# Patient Record
Sex: MALE | Race: WHITE | URBAN - METROPOLITAN AREA
[De-identification: names, ages, dates, MRNs, and addresses within clinical notes are randomized per-mention and may not be internally consistent; named-entity substitution may affect disease eponyms.]

---

## 2018-03-12 ENCOUNTER — OFFICE VISIT (OUTPATIENT)
Dept: FAMILY MEDICINE CLINIC | Age: 33
End: 2018-03-12

## 2018-03-12 VITALS
WEIGHT: 166 LBS | OXYGEN SATURATION: 98 % | RESPIRATION RATE: 16 BRPM | BODY MASS INDEX: 24.59 KG/M2 | DIASTOLIC BLOOD PRESSURE: 69 MMHG | SYSTOLIC BLOOD PRESSURE: 116 MMHG | HEART RATE: 55 BPM | TEMPERATURE: 97 F | HEIGHT: 69 IN

## 2018-03-12 DIAGNOSIS — H10.9 CONJUNCTIVITIS OF RIGHT EYE, UNSPECIFIED CONJUNCTIVITIS TYPE: Primary | ICD-10-CM

## 2018-03-12 RX ORDER — TOBRAMYCIN 3 MG/ML
1 SOLUTION/ DROPS OPHTHALMIC EVERY 6 HOURS
Qty: 1 BOTTLE | Refills: 1 | Status: SHIPPED | OUTPATIENT
Start: 2018-03-12 | End: 2018-03-22

## 2018-03-12 NOTE — PROGRESS NOTES
Layne Glynn is a 27 y.o. male presents in office to be seen for red eye x 3 days. Health Maintenance Due   Topic Date Due    DTaP/Tdap/Td series (1 - Tdap) 04/05/2004    Influenza Age 5 to Adult  08/01/2017       1. Have you been to the ER, urgent care clinic since your last visit? Hospitalized since your last visit?no    2. Have you seen or consulted any other health care providers outside of the [address] [place] [address] since your last visit? Include any pap smears or colon screening.  no

## 2018-03-12 NOTE — PROGRESS NOTES
Brett Hudson     Chief Complaint   Patient presents with    Red Eye     Vitals:    03/12/18 1511   BP: 116/69   Pulse: (!) 55   Resp: 16   Temp: 97 °F (36.1 °C)   TempSrc: Oral   SpO2: 98%   Weight: 166 lb (75.3 kg)   Height: 5' 9.02\" (1.753 m)   PainSc:   6   PainLoc: Eye         HPI: Patient is here complaining of right eye redness and tearing and some whitish discharge for the last 3 days, except when he had discharge that I feel blurred but there is no actual blurred vision he does not see double vision. He does not have any cold symptoms no nausea no vomiting no cough no runny nose. He does not have any chest pain no palpitation. He is not on any medication  History reviewed. No pertinent past medical history. History reviewed. No pertinent surgical history. Social History   Substance Use Topics    Smoking status: Former Smoker     Packs/day: 0.30     Years: 5.00     Types: Cigarettes    Smokeless tobacco: Never Used    Alcohol use 0.0 oz/week     0 Standard drinks or equivalent per week      Comment: socially       History reviewed. No pertinent family history. Review of Systems   Constitutional: Negative for chills, fever, malaise/fatigue and weight loss. HENT: Negative for congestion, ear discharge, ear pain, hearing loss and nosebleeds. Eyes: Positive for discharge and redness. Negative for blurred vision and double vision. Respiratory: Negative for cough. Cardiovascular: Negative for chest pain, palpitations, claudication and leg swelling. Gastrointestinal: Negative for abdominal pain, constipation, diarrhea, nausea and vomiting. Genitourinary: Negative for dysuria, frequency and urgency. Musculoskeletal: Negative for myalgias. Skin: Negative for itching and rash. Neurological: Negative for dizziness, tingling, sensory change, speech change, focal weakness, weakness and headaches. Physical Exam   Constitutional: He is oriented to person, place, and time.  He appears well-developed and well-nourished. No distress. HENT:   Head: Normocephalic and atraumatic. Mouth/Throat: No oropharyngeal exudate. Eyes: Conjunctivae are normal. Pupils are equal, round, and reactive to light. Right eye exhibits no discharge. Left eye exhibits no discharge. No scleral icterus. Right eye there is conjunctival erythema and there is no discharge at the time of the exam   Neck: Normal range of motion. Neck supple. No thyromegaly present. Cardiovascular: Normal rate, regular rhythm and normal heart sounds. Pulmonary/Chest: Effort normal and breath sounds normal. No respiratory distress. He has no rales. Abdominal: Soft. Musculoskeletal: Normal range of motion. He exhibits no edema, tenderness or deformity. Lymphadenopathy:     He has no cervical adenopathy. Neurological: He is alert and oriented to person, place, and time. No cranial nerve deficit. Coordination normal.   Skin: Skin is warm and dry. No rash noted. He is not diaphoretic. No erythema. Psychiatric: He has a normal mood and affect. Judgment and thought content normal.        Assessment and plan     1.  Conjunctivitis of right eye, unspecified conjunctivitis type  Tobramycin apply 3 times a day for 7 days    Current Outpatient Prescriptions   Medication Sig Dispense Refill    tobramycin (TOBREX) 0.3 % ophthalmic ointment Apply to the right eye  3 times daily for 7 days 1 Tube 1       Patient Active Problem List    Diagnosis Date Noted    Malaise and fatigue 04/11/2013    Body aches 04/11/2013    Sore throat 04/11/2013    Cough 04/11/2013     Results for orders placed or performed in visit on 09/14/12   AMB POC URINALYSIS DIP STICK AUTO W/O MICRO   Result Value Ref Range    Color (UA POC)  (none)    Clarity (UA POC)  (none)    Glucose (UA POC)  (none)    Bilirubin (UA POC)  (none)    Ketones (UA POC)  (none)    Specific gravity (UA POC)  1.001 - 1.035    Blood (UA POC)  (none)    pH (UA POC)  4.6 - 8.0 Protein (UA POC)  Negative mg/dL    Urobilinogen (UA POC)      Nitrites (UA POC)  (none)    Leukocyte esterase (UA POC)  (none)     No visits with results within 3 Month(s) from this visit. Latest known visit with results is:    Office Visit on 05/11/2011   Component Date Value Ref Range Status    T4, Free 05/11/2011 1.27  0.82 - 1.77 ng/dL Final    T3, total 05/11/2011 101  71 - 180 ng/dL Final    Comment: Performed At: [address]  [phone]                           David Ordonez MD                           [phone]          Follow-up Disposition:  Return if symptoms worsen or fail to improve.

## 2018-03-12 NOTE — MR AVS SNAPSHOT
[address] [facility] 
 
 
 [address] [address] 
[phone] Patient: Jenna Gee MRN: [id number] Visit Information Date & Time Provider Department Dept. Phone Encounter #  
 3/12/2018  3:00 PM 57 Wendy Figueredo MD [facility] [place] [phone] [id number] Follow-up Instructions Return if symptoms worsen or fail to improve. Upcoming Health Maintenance Date Due DTaP/Tdap/Td series (1 - Tdap) 4/5/2004 Influenza Age 5 to Adult 8/1/2017 Allergies as of 3/12/2018  Review Complete On: 3/12/2018 By: Myrna Breaux LPN No Known Allergies Current Immunizations  Never Reviewed No immunizations on file. Not reviewed this visit You Were Diagnosed With   
  
 Codes Comments Conjunctivitis of right eye, unspecified conjunctivitis type    -  Primary ICD-10-CM: H10.9 ICD-9-CM: 372.30 Vitals BP Pulse Temp Resp Height(growth percentile) Weight(growth percentile) 116/69 (!) 55 97 °F (36.1 °C) (Oral) 16 5' 9.02\" (1.753 m) 166 lb (75.3 kg) SpO2 BMI Smoking Status 98% 24.5 kg/m2 Former Smoker Vitals History BMI and BSA Data Body Mass Index Body Surface Area 24.5 kg/m 2 1.91 m 2 Preferred Pharmacy Pharmacy Name Phone [address] [place] [address] Ave AT [address] [place] [address] & [facility] [phone] Your Updated Medication List  
  
   
This list is accurate as of 3/12/18  3:34 PM.  Always use your most recent med list.  
  
  
  
  
 tobramycin 0.3 % ophthalmic ointment Commonly known as:  Megan Rosa Apply to the right eye  3 times daily for 7 days Prescriptions Sent to Pharmacy Refills  
 tobramycin (TOBREX) 0.3 % ophthalmic ointment 1 Sig: Apply to the right eye  3 times daily for 7 days  Class: Normal  
 Pharmacy: [facility] [address], [address]  #: [phone] Follow-up Instructions Return if symptoms worsen or fail to improve. Introducing [facility] SERVICES! Dear Hayde Ordonez: Thank you for requesting a [employer] account. Our records indicate that you already have an active [employer] account. You can access your account anytime at [email or web address] Did you know that you can access your hospital and ER discharge instructions at any time in [employer]? You can also review all of your test results from your hospital stay or ER visit. Additional Information If you have questions, please visit the Frequently Asked Questions section of the [employer] website at [email or web address] Remember, [employer] is NOT to be used for urgent needs. For medical emergencies, dial 911. Now available from your iPhone and Android! Please provide this summary of care documentation to your next provider. Your primary care clinician is listed as Paul Bains. If you have any questions after today's visit, please call [phone].

## 2018-03-14 ENCOUNTER — TELEPHONE (OUTPATIENT)
Dept: FAMILY MEDICINE CLINIC | Age: 33
End: 2018-03-14

## 2018-03-14 NOTE — TELEPHONE ENCOUNTER
Patient states he thinks he has the flu and wants a prescription for tamiflu. Patient is going out of town tomorrow. An appointment was offered.  Patient would like a call back at phone number [phone]

## 2018-03-15 ENCOUNTER — OFFICE VISIT (OUTPATIENT)
Dept: FAMILY MEDICINE CLINIC | Age: 33
End: 2018-03-15

## 2018-03-15 VITALS
DIASTOLIC BLOOD PRESSURE: 74 MMHG | TEMPERATURE: 99.1 F | BODY MASS INDEX: 23.7 KG/M2 | WEIGHT: 160 LBS | HEART RATE: 85 BPM | OXYGEN SATURATION: 100 % | SYSTOLIC BLOOD PRESSURE: 117 MMHG | HEIGHT: 69 IN

## 2018-03-15 DIAGNOSIS — H04.201 EYE TEARING, RIGHT: ICD-10-CM

## 2018-03-15 DIAGNOSIS — H57.89 REDNESS OF RIGHT EYE: ICD-10-CM

## 2018-03-15 DIAGNOSIS — J02.9 SORE THROAT: Primary | ICD-10-CM

## 2018-03-15 LAB
S PYO AG THROAT QL: NEGATIVE
VALID INTERNAL CONTROL?: YES

## 2018-03-15 RX ORDER — TETRAHYDROZOLINE HCL 0.05 %
1 DROPS OPHTHALMIC (EYE) 4 TIMES DAILY
Qty: 1 BOTTLE | Refills: 0 | Status: SHIPPED | OUTPATIENT
Start: 2018-03-15 | End: 2020-11-30

## 2018-03-15 NOTE — PROGRESS NOTES
Marsha Adler     Chief Complaint   Patient presents with    Eye Swelling     left    Cold Symptoms     Vitals:    03/15/18 0845   BP: 117/74   Pulse: 85   Temp: 99.1 °F (37.3 °C)   TempSrc: Oral   SpO2: 100%   Weight: 160 lb (72.6 kg)   Height: 5' 9\" (1.753 m)         HPI: Ms. Starla Bautista is here to follow-up in his right eye redness he has been started on tobramycin drops, there is no much change he still have the redness tearing and yellowish discharge in the morning, there is no visual changes. He developed chills and subjective fever body aches and sore throat, some painful swallowing. There is no cough no shortness of breath. No chest pain no nausea vomiting no palpitation no abdominal pain. History reviewed. No pertinent past medical history. History reviewed. No pertinent surgical history. Social History   Substance Use Topics    Smoking status: Former Smoker     Packs/day: 0.30     Years: 5.00     Types: Cigarettes    Smokeless tobacco: Never Used    Alcohol use 0.0 oz/week     0 Standard drinks or equivalent per week      Comment: socially       History reviewed. No pertinent family history. Review of Systems   Constitutional: Positive for chills and malaise/fatigue. Negative for fever and weight loss. HENT: Positive for sore throat. Negative for congestion, ear discharge, ear pain, hearing loss, nosebleeds and sinus pain. Eyes: Positive for discharge and redness. Negative for blurred vision, double vision, photophobia and pain. Respiratory: Negative for cough, sputum production, shortness of breath and wheezing. Cardiovascular: Negative for chest pain, palpitations, claudication and leg swelling. Gastrointestinal: Negative for abdominal pain, constipation, diarrhea, nausea and vomiting. Genitourinary: Negative for dysuria, frequency and urgency. Musculoskeletal: Positive for back pain. Negative for joint pain, myalgias and neck pain. Skin: Negative for itching and rash. Neurological: Negative for dizziness, tingling, sensory change, speech change, focal weakness, weakness and headaches. Psychiatric/Behavioral: Negative for depression, hallucinations, substance abuse and suicidal ideas. The patient is not nervous/anxious and does not have insomnia. Physical Exam   Constitutional: He is oriented to person, place, and time. He appears well-developed and well-nourished. No distress. HENT:   Head: Normocephalic and atraumatic. Mouth/Throat: No oropharyngeal exudate. There is some erythema in the throat   Eyes: Pupils are equal, round, and reactive to light. Right eye exhibits no discharge. Left eye exhibits no discharge. No scleral icterus. Right eye there is conjunctival redness, no discharge no blurred vision   Neck: Normal range of motion. Neck supple. No thyromegaly present. Cardiovascular: Normal rate, regular rhythm and normal heart sounds. Pulmonary/Chest: Effort normal and breath sounds normal. No respiratory distress. He has no rales. Abdominal: Soft. Bowel sounds are normal. He exhibits no distension and no mass. There is no tenderness. There is no rebound. Musculoskeletal: Normal range of motion. He exhibits no edema, tenderness or deformity. Lymphadenopathy:     He has no cervical adenopathy. Neurological: He is alert and oriented to person, place, and time. No cranial nerve deficit. Coordination normal.   Skin: Skin is warm and dry. No rash noted. He is not diaphoretic. No erythema. Psychiatric: He has a normal mood and affect. Judgment and thought content normal.        Assessment and plan     1. Sore throat   strep a rapid antigen is negative, most likely viral advised to gargle with salt and water and keep well-hydrated \" AMB POC RAPID STREP A    2. Redness of right eye  Not much improvement with the tobramycin drops with antihistamine for itching tearing to continue the tobramycin.     If patient not improving in 3-4 days he need to come back or if getting worse he should  come back sooner  - tetrahydrozoline (OPTI-CLEAR) 0.05 % ophthalmic solution; Administer 1 Drop to both eyes four (4) times daily. Dispense: 1 Bottle; Refill: 0    3. Eye tearing, right    - tetrahydrozoline (OPTI-CLEAR) 0.05 % ophthalmic solution; Administer 1 Drop to both eyes four (4) times daily. Dispense: 1 Bottle; Refill: 0    Current Outpatient Prescriptions   Medication Sig Dispense Refill    tetrahydrozoline (OPTI-CLEAR) 0.05 % ophthalmic solution Administer 1 Drop to both eyes four (4) times daily. 1 Bottle 0    tobramycin (TOBREX) 0.3 % ophthalmic solution Administer 1 Drop to both eyes every six (6) hours for 10 days. 1 Bottle 1       Patient Active Problem List    Diagnosis Date Noted    Malaise and fatigue 04/11/2013    Body aches 04/11/2013    Sore throat 04/11/2013    Cough 04/11/2013     Results for orders placed or performed in visit on 03/15/18   AMB POC RAPID STREP A   Result Value Ref Range    VALID INTERNAL CONTROL POC Yes     Group A Strep Ag Negative Negative     Office Visit on 03/15/2018   Component Date Value Ref Range Status    VALID INTERNAL CONTROL POC 03/15/2018 Yes   Final    Group A Strep Ag 03/15/2018 Negative  Negative Final          Follow-up Disposition:  Return if symptoms worsen or fail to improve.

## 2018-03-15 NOTE — PROGRESS NOTES
Estevan Marquez is a 27 y.o. male (: 1985) presenting to address:    Chief Complaint   Patient presents with    Eye Swelling     left    Cold Symptoms       Vitals:    03/15/18 0845   BP: 117/74   Pulse: 85   Temp: 99.1 °F (37.3 °C)   TempSrc: Oral   SpO2: 100%   Weight: 160 lb (72.6 kg)   Height: 5' 9\" (1.753 m)       Hearing/Vision:   No exam data present    Learning Assessment:     Learning Assessment 2014   PRIMARY LEARNER Patient   HIGHEST LEVEL OF EDUCATION - PRIMARY LEARNER  4 YEARS OF COLLEGE   BARRIERS PRIMARY LEARNER NONE   PRIMARY LANGUAGE ENGLISH   LEARNER PREFERENCE PRIMARY OTHER (COMMENT)   ANSWERED BY patient   RELATIONSHIP SELF     Depression Screening:     PHQ over the last two weeks 3/12/2018   Little interest or pleasure in doing things Not at all   Feeling down, depressed or hopeless Not at all   Total Score PHQ 2 0     Fall Risk Assessment:     Fall Risk Assessment, last 12 mths 3/12/2018   Able to walk? Yes   Fall in past 12 months? No     Abuse Screening:     Abuse Screening Questionnaire 3/12/2018   Do you ever feel afraid of your partner? N   Are you in a relationship with someone who physically or mentally threatens you? N   Is it safe for you to go home? Y     Coordination of Care Questionaire:   1. Have you been to the ER, urgent care clinic since your last visit? Hospitalized since your last visit? NO    2. Have you seen or consulted any other health care providers outside of the [address] [place] [address] since your last visit? Include any pap smears or colon screening.  NO

## 2018-03-16 NOTE — TELEPHONE ENCOUNTER
LM for patient to [facility] with more details of symptoms

## 2018-03-19 ENCOUNTER — IMPORTED ENCOUNTER (OUTPATIENT)
Dept: URBAN - METROPOLITAN AREA CLINIC 1 | Facility: CLINIC | Age: 33
End: 2018-03-19

## 2018-03-19 PROBLEM — H10.021: Noted: 2018-03-19

## 2018-03-19 PROBLEM — H16.201: Noted: 2018-03-19

## 2018-03-19 PROBLEM — H10.32: Noted: 2018-03-19

## 2018-03-19 PROCEDURE — 92002 INTRM OPH EXAM NEW PATIENT: CPT

## 2018-03-19 NOTE — PATIENT DISCUSSION
1.  Unspecified Acute Conjunctivitis OS2. Bacterial Conjunctivitis OD --The condition was discussed with the patient. Topical antibiotic Maxitrol oint bid OD Cont E-mycin oint ou. Also has filamentary keratitis sup OD. Adeno test neg3. Return for an appointment in 5 days for 10 with Dr. Rob White.

## 2018-03-23 ENCOUNTER — IMPORTED ENCOUNTER (OUTPATIENT)
Dept: URBAN - METROPOLITAN AREA CLINIC 1 | Facility: CLINIC | Age: 33
End: 2018-03-23

## 2018-03-23 PROBLEM — B30.9: Noted: 2018-03-23

## 2018-03-23 PROCEDURE — 92012 INTRM OPH EXAM EST PATIENT: CPT

## 2018-03-23 NOTE — PATIENT DISCUSSION
Viral Conjunctivitis OD>OS- by history pt's wife has also had pink eye and daughter had recent illness. Decrease Maxitrol SHEREE OD only QHS. D/C Emycin SHEREE. The condition was discussed with the patient. Start cool compresses and artificial tears PRN. Sample of Blink tears given to pt. The mechanism of transmission was explained and the patient was educated to wash hands and use separate towels and bedding. Return for an appointment with Dr. Lillian Alejandra ([facility] would like to see pt while he i in office w/ Dr. Any Segura).

## 2018-03-26 ENCOUNTER — IMPORTED ENCOUNTER (OUTPATIENT)
Dept: URBAN - METROPOLITAN AREA CLINIC 1 | Facility: CLINIC | Age: 33
End: 2018-03-26

## 2018-03-26 PROCEDURE — 92012 INTRM OPH EXAM EST PATIENT: CPT

## 2018-03-26 NOTE — PATIENT DISCUSSION
1.  Viral Conjunctivitis OD --Resolving. The mechanism of transmission was explained and the patient was educated to wash hands and use separate towels and bedding. 2. Unspecified Keratoconjunctivits OD -- The condition was discussed with patient. Pt feels eye is getting better now and wants to d/c ointment. Still has an area of pseudomembranous conjuctivitis inf temp od . If it does not clear use Maxirrol oint to the area qd. Filamentary keratitis superior OD that was severe initially there are only 2 now. PMG also saw him today. 3. Return for an appointment in 1 week for 10 with Dr. Raquel Drummond.

## 2018-03-28 PROBLEM — B30.9: Noted: 2018-03-28

## 2018-03-28 PROBLEM — H16.201: Noted: 2018-03-26

## 2022-04-02 ASSESSMENT — TONOMETRY
OD_IOP_MMHG: 17
OS_IOP_MMHG: 15
OD_IOP_MMHG: 15
OS_IOP_MMHG: 12
OS_IOP_MMHG: 15

## 2022-04-02 ASSESSMENT — VISUAL ACUITY
OS_CC: 20/20
OS_CC: 20/20
OD_CC: 20/20
OS_CC: 20/20